# Patient Record
Sex: FEMALE | Race: WHITE | NOT HISPANIC OR LATINO | Employment: FULL TIME | URBAN - METROPOLITAN AREA
[De-identification: names, ages, dates, MRNs, and addresses within clinical notes are randomized per-mention and may not be internally consistent; named-entity substitution may affect disease eponyms.]

---

## 2023-09-18 ENCOUNTER — OFFICE VISIT (OUTPATIENT)
Dept: URGENT CARE | Facility: CLINIC | Age: 33
End: 2023-09-18
Payer: COMMERCIAL

## 2023-09-18 VITALS
BODY MASS INDEX: 36.44 KG/M2 | HEIGHT: 62 IN | TEMPERATURE: 97.4 F | HEART RATE: 68 BPM | OXYGEN SATURATION: 98 % | WEIGHT: 198 LBS | RESPIRATION RATE: 16 BRPM | SYSTOLIC BLOOD PRESSURE: 114 MMHG | DIASTOLIC BLOOD PRESSURE: 68 MMHG

## 2023-09-18 DIAGNOSIS — B96.89 ACUTE BACTERIAL SINUSITIS: ICD-10-CM

## 2023-09-18 DIAGNOSIS — R09.81 NASAL CONGESTION: ICD-10-CM

## 2023-09-18 DIAGNOSIS — R51.9 SINUS HEADACHE: ICD-10-CM

## 2023-09-18 DIAGNOSIS — R05.1 ACUTE COUGH: ICD-10-CM

## 2023-09-18 DIAGNOSIS — J01.90 ACUTE BACTERIAL SINUSITIS: ICD-10-CM

## 2023-09-18 PROCEDURE — 3078F DIAST BP <80 MM HG: CPT | Performed by: NURSE PRACTITIONER

## 2023-09-18 PROCEDURE — 99203 OFFICE O/P NEW LOW 30 MIN: CPT | Performed by: NURSE PRACTITIONER

## 2023-09-18 PROCEDURE — 3074F SYST BP LT 130 MM HG: CPT | Performed by: NURSE PRACTITIONER

## 2023-09-18 RX ORDER — SULFAMETHOXAZOLE AND TRIMETHOPRIM 800; 160 MG/1; MG/1
1 TABLET ORAL 2 TIMES DAILY
Qty: 14 TABLET | Refills: 0 | Status: SHIPPED | OUTPATIENT
Start: 2023-09-18 | End: 2023-09-25

## 2023-09-18 NOTE — PROGRESS NOTES
"Subjective:   Billie Kam is a 33 y.o. female who presents for Nasal Congestion (Patient states that she is having some nasal congestion and cough that has been going for about 2 weeks. Patient states that on Friday she left work due to feeling dizzy and SOB. )       HPI  Patient is a pleasant 33 y.o. who presents for evaluation of 2-week history of sinus pressure and pain, nasal congestion, sinus headache, cough, and fatigue.  Patient has taken Tylenol Advil with very mild, intermittent relief of her symptoms.  Patient is unaware of any known ill contacts or exposures, however raids the [employer] employee best, and heard some people coughing a few days prior to becoming ill.  It is noted that patient has several antibiotic allergies, however she states that she tolerated sulfa in the past.    ROS  All other systems are negative except as documented above within [facility].    MEDS:   Current Outpatient Medications:     oxycodone-acetaminophen (PERCOCET) 5-325 MG TABS, Take 1 Tab by mouth every four hours as needed for Moderate Pain ((Pain Scale 4-6); If acetaminophen ineffective). (Patient not taking: Reported on 9/18/2023), Disp: 15 Tab, Rfl: 0    ibuprofen (MOTRIN) 800 MG TABS, Take 1 Tab by mouth every 8 hours as needed (Cramping). (Patient not taking: Reported on 9/18/2023), Disp: 30 Tab, Rfl: 1    Prenatal w/o A Vit-Fe Fum-FA (PRENATA PO), Take  by mouth. (Patient not taking: Reported on 9/18/2023), Disp: , Rfl:   ALLERGIES:   Allergies   Allergen Reactions    Pcn [Penicillins] Hives    Ceclor [Cefaclor] Rash    Eryc [Erythromycin] Rash    Other Drug      Duracept, mom told i was allergic to.    Sulfa Drugs Rash       Patient's PMH, SocHx, SurgHx, FamHx, Drug allergies and medications were reviewed.     Objective:   /68 (BP Location: Left arm, Patient Position: Sitting, BP Cuff Size: Adult)   Pulse 68   Temp 36.3 °C (97.4 °F) (Temporal)   Resp 16   Ht 1.575 m (5' 2\")   Wt 89.8 kg (198 lb)   SpO2 " 98%   BMI 36.21 kg/m²     Physical Exam  Vitals and nursing note reviewed.   Constitutional:       General: She is awake.      Appearance: Normal appearance. She is well-developed.   HENT:      Head: Normocephalic and atraumatic.      Right Ear: Tympanic membrane, ear canal and external ear normal.      Left Ear: Tympanic membrane, ear canal and external ear normal.      Nose: Nasal tenderness, mucosal edema, congestion and rhinorrhea present.      Right Turbinates: Enlarged.      Left Turbinates: Enlarged.      Right Sinus: Maxillary sinus tenderness present.      Left Sinus: Maxillary sinus tenderness present.      Mouth/Throat:      Lips: Pink.      Mouth: Mucous membranes are moist.      Pharynx: Oropharynx is clear. Uvula midline.   Eyes:      Extraocular Movements: Extraocular movements intact.      Conjunctiva/sclera: Conjunctivae normal.   Cardiovascular:      Rate and Rhythm: Normal rate and regular rhythm.      Pulses: Normal pulses.      Heart sounds: Normal heart sounds.   Pulmonary:      Effort: Pulmonary effort is normal.      Breath sounds: Normal breath sounds.   Musculoskeletal:         General: Normal range of motion.      Cervical back: Normal range of motion and neck supple.   Skin:     General: Skin is warm and dry.   Neurological:      General: No focal deficit present.      Mental Status: She is alert and oriented to person, place, and time.   Psychiatric:         Mood and Affect: Mood normal.         Behavior: Behavior normal. Behavior is cooperative.         Thought Content: Thought content normal.         Judgment: Judgment normal.         Assessment/Plan:   Assessment    1. Acute bacterial sinusitis  - sulfamethoxazole-trimethoprim (BACTRIM DS) 800-160 MG tablet; Take 1 Tablet by mouth 2 times a day for 7 days.  Dispense: 14 Tablet; Refill: 0    2. Nasal congestion    3. Sinus headache    4. Acute cough      Vital signs stable at today's acute urgent care visit.  Begin medications as  listed. Recommend over-the-counter decongestant and antihistamine.  Discussed patient allergies further, states that she tolerated self in the past.  Strict return precautions discussed with her at length.    Advised the patient to follow-up with the primary care provider if symptoms persist.  Red flags discussed and indications to immediately call 911 or present to the ED. All questions were encouraged and answered to the patient's satisfaction and understanding, and they agree to the plan of care.     This is an acute problem with uncertain prognosis, medication management and instructions as well as management options were provided.  I personally reviewed prior external notes and test results pertinent to today and independently reviewed and interpreted all diagnostics, to include POCT testing. Time spent evaluating this patient includes preparing for visit, counseling/education, exam, evaluation, obtaining history, and ordering lab/test/procedures.      Please note that this dictation was created using voice recognition software. I have made a reasonable attempt to correct obvious errors, but I expect that there are errors of grammar and possibly content that I did not discover before finalizing the note.

## 2023-09-18 NOTE — LETTER
September 18, 2023    To Whom It May Concern:         This is confirmation that Sudhalauren Kam attended her scheduled appointment with ANTON Mancera on 9/18/23.  Please excuse her from work on the date of 9/15 due to an acute illness.         If you have any questions please do not hesitate to call me at the phone number listed below.    Sincerely,          PHIL ManceraRJonnyN.  [phone]

## 2023-12-18 ENCOUNTER — HOSPITAL ENCOUNTER (EMERGENCY)
Facility: MEDICAL CENTER | Age: 33
End: 2023-12-18
Attending: STUDENT IN AN ORGANIZED HEALTH CARE EDUCATION/TRAINING PROGRAM

## 2023-12-18 VITALS
HEIGHT: 62 IN | BODY MASS INDEX: 36.03 KG/M2 | OXYGEN SATURATION: 96 % | HEART RATE: 68 BPM | SYSTOLIC BLOOD PRESSURE: 130 MMHG | DIASTOLIC BLOOD PRESSURE: 66 MMHG | WEIGHT: 195.77 LBS | TEMPERATURE: 97.8 F | RESPIRATION RATE: 16 BRPM

## 2023-12-18 DIAGNOSIS — N76.0 BACTERIAL VAGINOSIS: ICD-10-CM

## 2023-12-18 DIAGNOSIS — N89.8 VAGINAL DISCHARGE: ICD-10-CM

## 2023-12-18 DIAGNOSIS — B96.89 BACTERIAL VAGINOSIS: ICD-10-CM

## 2023-12-18 DIAGNOSIS — L73.9 FOLLICULITIS: ICD-10-CM

## 2023-12-18 LAB
APPEARANCE UR: CLEAR
BILIRUB UR QL STRIP.AUTO: NEGATIVE
CANDIDA DNA VAG QL PROBE+SIG AMP: NEGATIVE
COLOR UR: YELLOW
G VAGINALIS DNA VAG QL PROBE+SIG AMP: POSITIVE
GLUCOSE UR STRIP.AUTO-MCNC: NEGATIVE MG/DL
HIV 1+2 AB+HIV1 P24 AG SERPL QL IA: NORMAL
KETONES UR STRIP.AUTO-MCNC: ABNORMAL MG/DL
LEUKOCYTE ESTERASE UR QL STRIP.AUTO: NEGATIVE
MICRO URNS: ABNORMAL
NITRITE UR QL STRIP.AUTO: NEGATIVE
PH UR STRIP.AUTO: 5.5 [PH] (ref 5–8)
PROT UR QL STRIP: NEGATIVE MG/DL
RBC UR QL AUTO: NEGATIVE
SP GR UR STRIP.AUTO: 1.03
T PALLIDUM AB SER QL IA: NORMAL
T VAGINALIS DNA VAG QL PROBE+SIG AMP: NEGATIVE
UROBILINOGEN UR STRIP.AUTO-MCNC: 0.2 MG/DL

## 2023-12-18 PROCEDURE — 87480 CANDIDA DNA DIR PROBE: CPT

## 2023-12-18 PROCEDURE — 87389 HIV-1 AG W/HIV-1&-2 AB AG IA: CPT

## 2023-12-18 PROCEDURE — 99284 EMERGENCY DEPT VISIT MOD MDM: CPT

## 2023-12-18 PROCEDURE — 81003 URINALYSIS AUTO W/O SCOPE: CPT

## 2023-12-18 PROCEDURE — 87660 TRICHOMONAS VAGIN DIR PROBE: CPT

## 2023-12-18 PROCEDURE — 87510 GARDNER VAG DNA DIR PROBE: CPT

## 2023-12-18 PROCEDURE — 86780 TREPONEMA PALLIDUM: CPT

## 2023-12-18 PROCEDURE — 87491 CHLMYD TRACH DNA AMP PROBE: CPT

## 2023-12-18 PROCEDURE — 87591 N.GONORRHOEAE DNA AMP PROB: CPT

## 2023-12-18 RX ORDER — METRONIDAZOLE 500 MG/1
500 TABLET ORAL 2 TIMES DAILY
Qty: 14 TABLET | Refills: 0 | Status: ACTIVE | OUTPATIENT
Start: 2023-12-18 | End: 2023-12-25

## 2023-12-19 LAB
C TRACH DNA SPEC QL NAA+PROBE: NEGATIVE
N GONORRHOEA DNA SPEC QL NAA+PROBE: NEGATIVE
SPECIMEN SOURCE: NORMAL

## 2023-12-19 NOTE — ED TRIAGE NOTES
"Chief Complaint   Patient presents with    Vaginal Discharge     Pt report having white vaginal discharge x5 days. Pt also report bumps on both side her labia.        Pt ambulatory to triage. Pt A&Ox4, for above complaint.     Pt also complaining of cough, runny nose, denies fever x3days.     Pt to lobby. Pt educated on alerting staff in changes to condition. Pt verbalized understanding.      Blood Pressure (Abnormal) 147/87   Pulse 76   Temperature 36.2 °C (97.1 °F) (Temporal)   Respiration 18   Height 1.575 m (5' 2\")   Weight 88.8 kg (195 lb 12.3 oz)   Oxygen Saturation 99%   Body Mass Index 35.81 kg/m²       "

## 2023-12-19 NOTE — ED PROVIDER NOTES
"ED Provider Note    CHIEF COMPLAINT  Chief Complaint   Patient presents with    Vaginal Discharge     Pt report having white vaginal discharge x5 days. Pt also report bumps on both side her labia.        EXTERNAL RECORDS REVIEWED      HPI/ROS  LIMITATION TO HISTORY   Select: : None  OUTSIDE HISTORIAN(S):      Billie Kam is a 33 y.o. female who presents with about a week of white vaginal discharge after unprotected sexual encounter about 10 days ago.  Patient also notes multiple lesions around the external portion of her vagina that showed up about 3 days ago.    PAST MEDICAL HISTORY       SURGICAL HISTORY   has a past surgical history that includes other; myringotomy; removal of retained placenta (4/9/2012); and tonsillectomy (age 10).    FAMILY HISTORY  No family history on file.    SOCIAL HISTORY  Social History     Tobacco Use    Smoking status: Former     Current packs/day: 5.00     Average packs/day: 5.0 packs/day for 10.0 years (50.0 ttl pk-yrs)     Types: Cigarettes    Smokeless tobacco: Never   Substance and Sexual Activity    Alcohol use: No    Drug use: No    Sexual activity: Yes     Partners: Male     Comment: None       CURRENT MEDICATIONS  Home Medications       Reviewed by Chuck Mora R.N. (Registered Nurse) on 12/18/23 at 1756  Med List Status: Partial     Medication Last Dose Status   ibuprofen (MOTRIN) 800 MG TABS  Active   oxycodone-acetaminophen (PERCOCET) 5-325 MG TABS  Active   Prenatal w/o A Vit-Fe Fum-FA (PRENATA PO)  Active                    ALLERGIES  Allergies   Allergen Reactions    Pcn [Penicillins] Hives    Ceclor [Cefaclor] Rash    Eryc [Erythromycin] Rash    Other Drug      Duracept, mom told i was allergic to.    Sulfa Drugs Rash       PHYSICAL EXAM  VITAL SIGNS: /66   Pulse 68   Temp 36.6 °C (97.8 °F)   Resp 16   Ht 1.575 m (5' 2\")   Wt 88.8 kg (195 lb 12.3 oz)   SpO2 96%   BMI 35.81 kg/m²    Physical Exam  Vitals and nursing note reviewed. "   Constitutional:       Appearance: She is well-developed.   HENT:      Head: Normocephalic.   Eyes:      Extraocular Movements: Extraocular movements intact.      Pupils: Pupils are equal, round, and reactive to light.   Cardiovascular:      Rate and Rhythm: Normal rate and regular rhythm.   Pulmonary:      Effort: Pulmonary effort is normal.      Breath sounds: Normal breath sounds.   Abdominal:      Palpations: Abdomen is soft.      Tenderness: There is no abdominal tenderness. There is no guarding.   Genitourinary:     General: Normal vulva.      Comments: Small external hemorrhoid    There are 2 papular lesions in the superior region of the bilateral labial majora with no surrounding erythema.  There is no fluctuance present.  There is an additional papule on the inferior portion of the right labia majora approaching the introitus    Minimal vaginal discharge present  Musculoskeletal:      Cervical back: Normal range of motion.   Neurological:      Mental Status: She is alert and oriented to person, place, and time.         DIAGNOSTIC STUDIES / PROCEDURES      LABS  Labs Reviewed   URINALYSIS,CULTURE IF INDICATED - Abnormal; Notable for the following components:       Result Value    Ketones Trace (*)     All other components within normal limits    Narrative:     Indication for culture:->Patient WITHOUT an indwelling Salvador  catheter in place with new onset of Dysuria, Frequency,  Urgency, and/or Suprapubic pain   BACTERIAL VAGINOSIS/VAGINITIS PANEL - Abnormal; Notable for the following components:    Gardnerella vaginalis DNA Probe POSITIVE (*)     All other components within normal limits    Narrative:     Indication for culture:->Patient WITHOUT an indwelling Salvador  catheter in place with new onset of Dysuria, Frequency,  Urgency, and/or Suprapubic pain   CHLAMYDIA/GC, PCR (URINE)    Narrative:     Indication for culture:->Patient WITHOUT an indwelling Salvador  catheter in place with new onset of Dysuria,  Frequency,  Urgency, and/or Suprapubic pain   T.PALLIDUM AB TERRY (SCREENING)   HIV AG/AB COMBO ASSAY SCREENING       COURSE & MEDICAL DECISION MAKING    ED Observation Status? No; Patient does not meet criteria for ED Observation.     INITIAL ASSESSMENT, COURSE AND PLAN  Care Narrative: 33-year-old female who presents to the ED for vaginal discharge after unprotected intercourse on exam she is well-appearing with normal vital signs there is no abdominal tenderness present on exam she has 3 areas of infected hair follicles with no fluctuance or surrounding erythema present.  Minimal discharges noted on external vaginal exam.  Swabs obtained and additional STI testing obtained at patient request overall was negative aside from positive result for bacterial vaginosis.  Patient called back with test results and prescribed Flagyl.  Was placed on mupirocin topically for mild folliculitis of the labia majora.        ADDITIONAL PROBLEM LIST  No past medical history on file.    DISPOSITION AND DISCUSSIONS  I have discussed management of the patient with the following physicians and MARQUEZ's:      Discussion of management with other QHP or appropriate source(s): None     Escalation of care considered, and ultimately not performed:diagnostic imaging    Barriers to care at this time, including but not limited to: Patient does not have established PCP [place] .     Decision tools and prescription drugs considered including, but not limited to: Antibiotics Flagyl for bacterial vaginosis .    FINAL DIAGNOSIS  1. Vaginal discharge Acute   2. Folliculitis Acute   3. Bacterial vaginosis Acute          Electronically signed by: Deniz Thomas M.D., 12/18/2023 9:44 PM

## 2023-12-22 NOTE — ED NOTES
"ED Positive Culture Follow-up/Notification Note:    Date: 12/22/23     Patient seen in the ED on 12/18/2023 for white vaginal discharge after unprotected sexual intercourse and multiple lesions around her labia. Discharged with a prescription for mupiocin and metronidazole 500 mg po BID x 7 days.   1. Vaginal discharge Acute   2. Folliculitis Acute   3. Bacterial vaginosis Acute      Discharge Medication List as of 12/18/2023  9:27 PM        START taking these medications    Details   mupirocin (BACTROBAN) 2 % Ointment Apply 1 Application topically 2 times a day for 7 days., Disp-15 g, R-0, Normal             Allergies: Pcn [penicillins], Ceclor [cefaclor], Eryc [erythromycin], Other drug, and Sulfa drugs     Vitals:    12/18/23 1735 12/18/23 1751 12/18/23 2127   BP: (!) 147/87  130/66   Pulse: 76  68   Resp: 18  16   Temp: 36.2 °C (97.1 °F)  36.6 °C (97.8 °F)   TempSrc: Temporal     SpO2: 99%  96%   Weight:  88.8 kg (195 lb 12.3 oz)    Height:  1.575 m (5' 2\")        Final cultures:    12/18/23 21:19   HIV Ag/Ab Combo Assay Non-Reactive   Syphilis, Treponemal Qual Non-Reactive     Results       Procedure Component Value Units Date/Time    Chlamydia/GC, PCR (Urine) [[id number]] Collected: 12/18/23 2017    Order Status: Completed Specimen: Urine Updated: 12/19/23 1749     C. trachomatis by PCR Negative     Gc By Dna Probe Negative     Source Urine Urine    Narrative:      Indication for culture:->Patient WITHOUT an indwelling Salvador  catheter in place with new onset of Dysuria, Frequency,  Urgency, and/or Suprapubic pain    URINALYSIS CULTURE, IF INDICATED [[id number]]  (Abnormal) Collected: 12/18/23 2017    Order Status: Completed Specimen: Urine, Clean Catch Updated: 12/18/23 2037     Color Yellow     Character Clear     Specific Gravity 1.033     Ph 5.5     Glucose Negative mg/dL      Ketones Trace mg/dL      Protein Negative mg/dL      Bilirubin Negative     Urobilinogen, Urine 0.2     Nitrite Negative     Leukocyte " Esterase Negative     Occult Blood Negative     Micro Urine Req see below     Comment: Microscopic examination not performed when specimen is clear  and chemically negative for protein, blood, leukocyte esterase  and nitrite.         Narrative:      Indication for culture:->Patient WITHOUT an indwelling Salvador  catheter in place with new onset of Dysuria, Frequency,  Urgency, and/or Suprapubic pain           12/18/23 20:53   Candida species DNA Probe Negative   Gardnerella vaginalis DNA Probe POSITIVE !   Trichamonas vaginalis DNA Probe Negative   !: Data is abnormal    Plan:   Patient with bacterial vaginosis. This has already been addressed by the ER physician and patient has been treated appropriately. No further follow up is required.    Salima Bright, PharmD

## 2024-10-05 ENCOUNTER — HOSPITAL ENCOUNTER (EMERGENCY)
Facility: MEDICAL CENTER | Age: 34
End: 2024-10-05
Attending: EMERGENCY MEDICINE

## 2024-10-05 VITALS
OXYGEN SATURATION: 98 % | HEART RATE: 87 BPM | BODY MASS INDEX: 28.56 KG/M2 | HEIGHT: 62 IN | DIASTOLIC BLOOD PRESSURE: 77 MMHG | RESPIRATION RATE: 16 BRPM | WEIGHT: 155.2 LBS | TEMPERATURE: 98.7 F | SYSTOLIC BLOOD PRESSURE: 123 MMHG

## 2024-10-05 DIAGNOSIS — A49.8 GARDNERELLA INFECTION: ICD-10-CM

## 2024-10-05 DIAGNOSIS — Z20.2 POSSIBLE EXPOSURE TO STI: ICD-10-CM

## 2024-10-05 DIAGNOSIS — L03.211 FACIAL CELLULITIS: ICD-10-CM

## 2024-10-05 LAB
CANDIDA DNA VAG QL PROBE+SIG AMP: NEGATIVE
G VAGINALIS DNA VAG QL PROBE+SIG AMP: POSITIVE
HIV 1+2 AB+HIV1 P24 AG SERPL QL IA: NORMAL
T VAGINALIS DNA VAG QL PROBE+SIG AMP: NEGATIVE

## 2024-10-05 PROCEDURE — A9270 NON-COVERED ITEM OR SERVICE: HCPCS | Performed by: EMERGENCY MEDICINE

## 2024-10-05 PROCEDURE — 87510 GARDNER VAG DNA DIR PROBE: CPT

## 2024-10-05 PROCEDURE — 700102 HCHG RX REV CODE 250 W/ 637 OVERRIDE(OP): Performed by: EMERGENCY MEDICINE

## 2024-10-05 PROCEDURE — 87660 TRICHOMONAS VAGIN DIR PROBE: CPT

## 2024-10-05 PROCEDURE — 99283 EMERGENCY DEPT VISIT LOW MDM: CPT

## 2024-10-05 PROCEDURE — 36415 COLL VENOUS BLD VENIPUNCTURE: CPT

## 2024-10-05 PROCEDURE — 87491 CHLMYD TRACH DNA AMP PROBE: CPT

## 2024-10-05 PROCEDURE — 87591 N.GONORRHOEAE DNA AMP PROB: CPT

## 2024-10-05 PROCEDURE — 87480 CANDIDA DNA DIR PROBE: CPT

## 2024-10-05 PROCEDURE — 87389 HIV-1 AG W/HIV-1&-2 AB AG IA: CPT

## 2024-10-05 RX ORDER — CLINDAMYCIN HYDROCHLORIDE 150 MG/1
450 CAPSULE ORAL ONCE
Status: COMPLETED | OUTPATIENT
Start: 2024-10-05 | End: 2024-10-05

## 2024-10-05 RX ORDER — CLINDAMYCIN HYDROCHLORIDE 150 MG/1
450 CAPSULE ORAL 3 TIMES DAILY
Qty: 45 CAPSULE | Refills: 0 | Status: ACTIVE | OUTPATIENT
Start: 2024-10-05 | End: 2024-10-07

## 2024-10-05 RX ADMIN — CLINDAMYCIN HYDROCHLORIDE 450 MG: 150 CAPSULE ORAL at 15:56

## 2024-10-05 ASSESSMENT — LIFESTYLE VARIABLES: DO YOU DRINK ALCOHOL: NO

## 2024-10-06 LAB
C TRACH DNA GENITAL QL NAA+PROBE: POSITIVE
N GONORRHOEA DNA GENITAL QL NAA+PROBE: NEGATIVE
SPECIMEN SOURCE: ABNORMAL

## 2024-10-06 RX ORDER — METRONIDAZOLE 500 MG/1
500 TABLET ORAL 2 TIMES DAILY
Qty: 14 TABLET | Refills: 0 | Status: ACTIVE | OUTPATIENT
Start: 2024-10-06 | End: 2024-10-07

## 2024-10-07 RX ORDER — METRONIDAZOLE 500 MG/1
500 TABLET ORAL 2 TIMES DAILY
Qty: 14 TABLET | Refills: 0 | Status: ACTIVE | OUTPATIENT
Start: 2024-10-07 | End: 2024-10-07

## 2024-10-07 RX ORDER — CLINDAMYCIN HYDROCHLORIDE 150 MG/1
450 CAPSULE ORAL 3 TIMES DAILY
Qty: 45 CAPSULE | Refills: 0 | Status: ACTIVE | OUTPATIENT
Start: 2024-10-07 | End: 2024-10-12

## 2024-10-07 RX ORDER — DOXYCYCLINE 100 MG/1
100 CAPSULE ORAL 2 TIMES DAILY
Qty: 14 CAPSULE | Refills: 0 | Status: ACTIVE | OUTPATIENT
Start: 2024-10-07 | End: 2024-10-14

## 2025-07-21 ENCOUNTER — NON-PROVIDER VISIT (OUTPATIENT)
Dept: OCCUPATIONAL MEDICINE | Facility: CLINIC | Age: 35
End: 2025-07-21

## 2025-07-21 DIAGNOSIS — Z02.1 PRE-EMPLOYMENT DRUG SCREENING: Primary | ICD-10-CM

## 2025-07-21 LAB
AMP AMPHETAMINE: NORMAL
COC COCAINE: NORMAL
INT CON NEG: NORMAL
INT CON POS: NORMAL
MET METHAMPHETAMINES: NORMAL
OPI OPIATES: NORMAL
PCP PHENCYCLIDINE: NORMAL
POC DRUG COMMENT 753798-OCCUPATIONAL HEALTH: NORMAL
THC: NORMAL

## 2025-07-21 PROCEDURE — 80305 DRUG TEST PRSMV DIR OPT OBS: CPT | Performed by: PREVENTIVE MEDICINE
